# Patient Record
Sex: FEMALE | Race: BLACK OR AFRICAN AMERICAN
[De-identification: names, ages, dates, MRNs, and addresses within clinical notes are randomized per-mention and may not be internally consistent; named-entity substitution may affect disease eponyms.]

---

## 2021-02-25 ENCOUNTER — HOSPITAL ENCOUNTER (OUTPATIENT)
Dept: HOSPITAL 83 - LAB | Age: 46
Discharge: HOME | End: 2021-02-25
Attending: FAMILY MEDICINE
Payer: COMMERCIAL

## 2021-02-25 DIAGNOSIS — R53.83: ICD-10-CM

## 2021-02-25 DIAGNOSIS — E55.9: ICD-10-CM

## 2021-02-25 DIAGNOSIS — E78.5: Primary | ICD-10-CM

## 2021-02-25 DIAGNOSIS — R79.89: ICD-10-CM

## 2021-02-25 LAB
25(OH)D3 SERPL-MCNC: 32.5 NG/ML (ref 30–100)
ALBUMIN SERPL-MCNC: 4.1 GM/DL (ref 3.1–4.5)
ALP SERPL-CCNC: 43 U/L (ref 45–117)
ALT SERPL W P-5'-P-CCNC: 16 U/L (ref 12–78)
AST SERPL-CCNC: 17 IU/L (ref 3–35)
B-HCG SERPL-ACNC: < 1 MIU/ML (ref 1–3)
B-HCG SERPL-ACNC: NEGATIVE
BACTERIA #/AREA URNS HPF: (no result) /[HPF]
BASOPHILS # BLD AUTO: 0 10*3/UL (ref 0–0.1)
BASOPHILS NFR BLD AUTO: 0.7 % (ref 0–1)
BUN SERPL-MCNC: 8 MG/DL (ref 7–24)
CARBAMAZEPINE SERPL-MCNC: 9.5 UG/ML (ref 4–12)
CHLORIDE SERPL-SCNC: 107 MMOL/L (ref 98–107)
CHOLEST SERPL-MCNC: 292 MG/DL (ref ?–200)
CREAT SERPL-MCNC: 0.98 MG/DL (ref 0.55–1.02)
EOSINOPHIL # BLD AUTO: 0.1 10*3/UL (ref 0–0.4)
EOSINOPHIL # BLD AUTO: 1.6 % (ref 1–4)
EPI CELLS #/AREA URNS HPF: (no result) /[HPF]
ERYTHROCYTE [DISTWIDTH] IN BLOOD BY AUTOMATED COUNT: 12.2 % (ref 0–14.5)
FERRITIN SERPL-MCNC: 47.5 NG/ML (ref 10–291)
GGT SERPL-CCNC: 79 U/L (ref 5–55)
HCT VFR BLD AUTO: 39.7 % (ref 37–47)
HDLC SERPL-MCNC: 138 MG/DL (ref 40–60)
HGB UR QL STRIP: (no result)
IRON SERPL-MCNC: 91 UG/DL (ref 50–170)
LDLC SERPL DIRECT ASSAY-MCNC: 141 MG/DL (ref 9–159)
LYMPHOCYTES # BLD AUTO: 1.7 10*3/UL (ref 1.3–4.4)
LYMPHOCYTES NFR BLD AUTO: 38.6 % (ref 27–41)
MCH RBC QN AUTO: 33.3 PG (ref 27–31)
MCHC RBC AUTO-ENTMCNC: 33.5 G/DL (ref 33–37)
MCV RBC AUTO: 99.3 FL (ref 81–99)
MONOCYTES # BLD AUTO: 0.3 10*3/UL (ref 0.1–1)
MONOCYTES NFR BLD MANUAL: 6.5 % (ref 3–9)
NEUT #: 2.3 10*3/UL (ref 2.3–7.9)
NEUT %: 52.6 % (ref 47–73)
NRBC BLD QL AUTO: 0 10*3/UL (ref 0–0)
PH UR STRIP: 5 [PH] (ref 4.5–8)
PLATELET # BLD AUTO: 261 10*3/UL (ref 130–400)
PMV BLD AUTO: 8.5 FL (ref 9.6–12.3)
POTASSIUM SERPL-SCNC: 3.6 MMOL/L (ref 3.5–5.1)
PROT SERPL-MCNC: 7.6 GM/DL (ref 6.4–8.2)
RBC # BLD AUTO: 4 10*6/UL (ref 4.1–5.1)
RBC #/AREA URNS HPF: (no result) RBC/HPF (ref 0–2)
RETICS/RBC NFR AUTO: 0.79 % (ref 0.5–2.5)
SODIUM SERPL-SCNC: 141 MMOL/L (ref 136–145)
SP GR UR: >= 1.03 (ref 1–1.03)
T3 SERPL-MCNC: 30 % (ref 31–39)
T4 SERPL-MCNC: 7 UG/DL (ref 4.8–13.9)
TIBC SERPL-MCNC: 162 UG/DL (ref 250–450)
TRIGL SERPL-MCNC: 67 MG/DL (ref ?–150)
TSH SERPL DL<=0.005 MIU/L-ACNC: 2.19 UIU/ML (ref 0.36–4.75)
UROBILINOGEN UR STRIP-MCNC: 1 E.U./DL (ref 0–1)
VITAMIN B12: 1006 PG/ML (ref 247–911)
VLDLC SERPL CALC-MCNC: 13 MG/DL (ref 6–40)
WBC #/AREA URNS HPF: (no result) WBC/HPF (ref 0–5)
WBC NRBC COR # BLD AUTO: 4.3 10*3/UL (ref 4.8–10.8)

## 2021-02-26 LAB
CALCIUM 24H UR-MRATE: 11.5 NG/ML (ref 4.8–23.3)
DHEA-S SERPL-MCNC: 49.1 UG/DL (ref 57.3–279.2)
FSH SERPL-ACNC: 18.3 MIU/ML
LH SERPL-ACNC: 12.9 MIU/ML
PROGEST SERPL-MCNC: 1.1 NG/ML
SHBG SERPL-SCNC: 349 NMOL/L (ref 24.6–122)

## 2021-02-27 LAB
IGF-I SERPL-MCNC: 106 NG/ML (ref 74–239)
TESTOST FREE SERPL-MCNC: 1.2 PG/ML (ref 0–4.2)

## 2021-03-02 LAB — DHEA SERPL-MCNC: 734 NG/DL (ref 31–701)

## 2021-03-04 ENCOUNTER — HOSPITAL ENCOUNTER (EMERGENCY)
Dept: HOSPITAL 83 - ED | Age: 46
Discharge: LEFT BEFORE DISCHARGE | End: 2021-03-04
Payer: COMMERCIAL

## 2021-03-04 VITALS — WEIGHT: 175 LBS | BODY MASS INDEX: 26.52 KG/M2 | HEIGHT: 67.99 IN

## 2021-03-04 VITALS — DIASTOLIC BLOOD PRESSURE: 74 MMHG

## 2021-03-04 DIAGNOSIS — F41.9: ICD-10-CM

## 2021-03-04 DIAGNOSIS — N39.0: ICD-10-CM

## 2021-03-04 DIAGNOSIS — R42: Primary | ICD-10-CM

## 2021-03-04 DIAGNOSIS — K21.9: ICD-10-CM

## 2021-03-04 DIAGNOSIS — Z53.21: ICD-10-CM

## 2021-03-15 ENCOUNTER — HOSPITAL ENCOUNTER (OUTPATIENT)
Dept: HOSPITAL 83 - RAD | Age: 46
Discharge: HOME | End: 2021-03-15
Attending: FAMILY MEDICINE
Payer: COMMERCIAL

## 2021-03-15 DIAGNOSIS — M48.02: Primary | ICD-10-CM

## 2021-03-15 DIAGNOSIS — M46.02: ICD-10-CM

## 2021-04-30 ENCOUNTER — INITIAL CONSULT (OUTPATIENT)
Dept: NEUROSURGERY | Age: 46
End: 2021-04-30
Payer: MEDICAID

## 2021-04-30 VITALS
WEIGHT: 177 LBS | DIASTOLIC BLOOD PRESSURE: 78 MMHG | BODY MASS INDEX: 26.83 KG/M2 | HEART RATE: 98 BPM | HEIGHT: 68 IN | SYSTOLIC BLOOD PRESSURE: 128 MMHG

## 2021-04-30 DIAGNOSIS — M47.812 CERVICAL SPONDYLOSIS: Primary | ICD-10-CM

## 2021-04-30 PROCEDURE — 1036F TOBACCO NON-USER: CPT | Performed by: PHYSICIAN ASSISTANT

## 2021-04-30 PROCEDURE — 99204 OFFICE O/P NEW MOD 45 MIN: CPT | Performed by: PHYSICIAN ASSISTANT

## 2021-04-30 PROCEDURE — G8419 CALC BMI OUT NRM PARAM NOF/U: HCPCS | Performed by: PHYSICIAN ASSISTANT

## 2021-04-30 PROCEDURE — G8427 DOCREV CUR MEDS BY ELIG CLIN: HCPCS | Performed by: PHYSICIAN ASSISTANT

## 2021-04-30 RX ORDER — CARBAMAZEPINE 400 MG/1
1 TABLET, EXTENDED RELEASE ORAL 2 TIMES DAILY
COMMUNITY
Start: 2021-03-19

## 2021-04-30 RX ORDER — LAMOTRIGINE 150 MG/1
1 TABLET ORAL 4 TIMES DAILY
COMMUNITY
Start: 2021-03-22

## 2021-04-30 ASSESSMENT — ENCOUNTER SYMPTOMS
SHORTNESS OF BREATH: 0
TROUBLE SWALLOWING: 0
ABDOMINAL PAIN: 0
PHOTOPHOBIA: 0

## 2021-04-30 NOTE — PROGRESS NOTES
Subjective:      Patient ID: Akbar Medina is a 40 y.o. female. Cherri Moore is a 40year old female presenting to the office today as a new patient c/o left sided neck pain with radiation into her left shoulder. Describes the pain as intermittent, sharp, and throbbing. Admits to associated numbness in her fingertips and muscle spams. Pain has been present for 2 years, but has been progressively worsening. Lifting objects makes the symptoms worse. Rest provides some relief. Denies recent PT or ODELL. Denies headaches, pain down the extremities, loss of bowel or bladder, saddle anesthesia, fever, chills, cough, SOB, or chest pain. X-ray cervical spine demonstrates straightening of normal curvature, mild disc space narrowing at C5-C6 with anterior osteophytes. Review of Systems   Constitutional: Negative for fever. HENT: Negative for trouble swallowing. Eyes: Negative for photophobia. Respiratory: Negative for shortness of breath. Cardiovascular: Negative for chest pain. Gastrointestinal: Negative for abdominal pain. Endocrine: Negative for heat intolerance. Genitourinary: Negative for flank pain. Musculoskeletal: Positive for arthralgias, myalgias, neck pain and neck stiffness. Skin: Negative for wound. Neurological: Positive for numbness. Negative for headaches. Psychiatric/Behavioral: Negative for confusion. Objective:   Physical Exam  Constitutional:       Appearance: Normal appearance. She is well-developed. HENT:      Head: Normocephalic and atraumatic. Eyes:      Extraocular Movements: Extraocular movements intact. Conjunctiva/sclera: Conjunctivae normal.      Pupils: Pupils are equal, round, and reactive to light. Neck:      Musculoskeletal: Normal range of motion and neck supple. Comments: No significant tenderness to palpation of cervical spine  Cardiovascular:      Rate and Rhythm: Normal rate.    Pulmonary:      Effort: Pulmonary effort is normal. Abdominal:      General: There is no distension. Musculoskeletal: Normal range of motion. Skin:     General: Skin is warm and dry. Neurological:      Mental Status: She is alert. Comments: Alert and oriented x3  CN3-12 intact  Motor strength full  Sensation intact to light touch  No Hoffmans   Psychiatric:         Thought Content: Thought content normal.         Assessment: This is a 40year old female presenting for neck pain      Plan:      -Obtain MRI cervical spine to r/o any spinal stenosis  -Pt is also having symptoms of musculoskeletal pain.  Recommend NSAIDS, heat/ice, massage therapy, and PT  -[facility] report reviewed  -Call/return to Neurosurgery clinic after completion of imaging to discuss results and further treatment plan  -Call/return sooner if symptoms worsen or new issues arise in the interim           Aarti Montoya PA-C

## 2021-06-03 ENCOUNTER — HOSPITAL ENCOUNTER (EMERGENCY)
Dept: HOSPITAL 83 - ED | Age: 46
Discharge: HOME | End: 2021-06-03
Payer: COMMERCIAL

## 2021-06-03 VITALS — DIASTOLIC BLOOD PRESSURE: 78 MMHG

## 2021-06-03 VITALS — HEIGHT: 67.99 IN | WEIGHT: 174 LBS | BODY MASS INDEX: 26.37 KG/M2

## 2021-06-03 DIAGNOSIS — Z98.890: ICD-10-CM

## 2021-06-03 DIAGNOSIS — R00.2: Primary | ICD-10-CM

## 2021-06-03 DIAGNOSIS — Z79.899: ICD-10-CM

## 2021-06-03 DIAGNOSIS — Z98.51: ICD-10-CM

## 2021-06-03 DIAGNOSIS — Z88.0: ICD-10-CM

## 2021-06-03 LAB
ALBUMIN SERPL-MCNC: 3.6 GM/DL (ref 3.1–4.5)
ALP SERPL-CCNC: 43 U/L (ref 45–117)
ALT SERPL W P-5'-P-CCNC: 14 U/L (ref 12–78)
AST SERPL-CCNC: 12 IU/L (ref 3–35)
BASOPHILS # BLD AUTO: 0 10*3/UL (ref 0–0.1)
BASOPHILS NFR BLD AUTO: 0.8 % (ref 0–1)
BUN SERPL-MCNC: 8 MG/DL (ref 7–24)
CHLORIDE SERPL-SCNC: 106 MMOL/L (ref 98–107)
CREAT SERPL-MCNC: 0.87 MG/DL (ref 0.55–1.02)
EOSINOPHIL # BLD AUTO: 0.2 10*3/UL (ref 0–0.4)
EOSINOPHIL # BLD AUTO: 3.8 % (ref 1–4)
ERYTHROCYTE [DISTWIDTH] IN BLOOD BY AUTOMATED COUNT: 12.5 % (ref 0–14.5)
HCT VFR BLD AUTO: 37.3 % (ref 37–47)
LYMPHOCYTES # BLD AUTO: 1.5 10*3/UL (ref 1.3–4.4)
LYMPHOCYTES NFR BLD AUTO: 37.6 % (ref 27–41)
MCH RBC QN AUTO: 32.4 PG (ref 27–31)
MCHC RBC AUTO-ENTMCNC: 32.4 G/DL (ref 33–37)
MCV RBC AUTO: 100 FL (ref 81–99)
MONOCYTES # BLD AUTO: 0.4 10*3/UL (ref 0.1–1)
MONOCYTES NFR BLD MANUAL: 9.3 % (ref 3–9)
NEUT #: 1.9 10*3/UL (ref 2.3–7.9)
NEUT %: 48.2 % (ref 47–73)
NRBC BLD QL AUTO: 0 10*3/UL (ref 0–0)
PLATELET # BLD AUTO: 225 10*3/UL (ref 130–400)
PMV BLD AUTO: 8.5 FL (ref 9.6–12.3)
POTASSIUM SERPL-SCNC: 3.2 MMOL/L (ref 3.5–5.1)
PROT SERPL-MCNC: 7.4 GM/DL (ref 6.4–8.2)
RBC # BLD AUTO: 3.73 10*6/UL (ref 4.1–5.1)
SODIUM SERPL-SCNC: 139 MMOL/L (ref 136–145)
TROPONIN I SERPL-MCNC: < 0.015 NG/ML (ref ?–0.04)
WBC NRBC COR # BLD AUTO: 4 10*3/UL (ref 4.8–10.8)

## 2021-06-15 ENCOUNTER — TELEPHONE (OUTPATIENT)
Dept: NEUROSURGERY | Age: 46
End: 2021-06-15

## 2021-06-15 NOTE — TELEPHONE ENCOUNTER
Left  for patient to call and schedule mri cervical . Gave  Her radiology number.  Approval is in media

## 2022-04-29 ENCOUNTER — HOSPITAL ENCOUNTER (OUTPATIENT)
Dept: HOSPITAL 83 - LAB | Age: 47
Discharge: HOME | End: 2022-04-29
Attending: FAMILY MEDICINE
Payer: COMMERCIAL

## 2022-04-29 DIAGNOSIS — R74.8: ICD-10-CM

## 2022-04-29 DIAGNOSIS — R53.83: ICD-10-CM

## 2022-04-29 DIAGNOSIS — E55.9: ICD-10-CM

## 2022-04-29 DIAGNOSIS — R79.89: ICD-10-CM

## 2022-04-29 DIAGNOSIS — E78.5: Primary | ICD-10-CM

## 2022-04-29 LAB
25(OH)D3 SERPL-MCNC: 28.9 NG/ML (ref 30–100)
ALP SERPL-CCNC: 35 U/L (ref 45–117)
ALT SERPL W P-5'-P-CCNC: 17 U/L (ref 12–78)
AST SERPL-CCNC: 16 IU/L (ref 3–35)
BACTERIA #/AREA URNS HPF: (no result) /[HPF]
BASOPHILS # BLD AUTO: 0 10*3/UL (ref 0–0.1)
BASOPHILS NFR BLD AUTO: 0.7 % (ref 0–1)
BUN SERPL-MCNC: 8 MG/DL (ref 7–24)
CHLORIDE SERPL-SCNC: 105 MMOL/L (ref 98–107)
CHOLEST SERPL-MCNC: 261 MG/DL (ref ?–200)
CREAT SERPL-MCNC: 0.93 MG/DL (ref 0.55–1.02)
EOSINOPHIL # BLD AUTO: 0.1 10*3/UL (ref 0–0.4)
EOSINOPHIL # BLD AUTO: 2.1 % (ref 1–4)
EPI CELLS #/AREA URNS HPF: (no result) /[HPF]
ERYTHROCYTE [DISTWIDTH] IN BLOOD BY AUTOMATED COUNT: 12.6 % (ref 0–14.5)
FERRITIN SERPL-MCNC: 25.3 NG/ML (ref 10–291)
GGT SERPL-CCNC: 69 U/L (ref 5–55)
HCT VFR BLD AUTO: 37.8 % (ref 37–47)
IRON SERPL-MCNC: 91 UG/DL (ref 50–170)
LDLC SERPL DIRECT ASSAY-MCNC: 114 MG/DL (ref 9–159)
LYMPHOCYTES # BLD AUTO: 2.1 10*3/UL (ref 1.3–4.4)
LYMPHOCYTES NFR BLD AUTO: 49.6 % (ref 27–41)
MCH RBC QN AUTO: 33.6 PG (ref 27–31)
MCHC RBC AUTO-ENTMCNC: 34.1 G/DL (ref 33–37)
MCV RBC AUTO: 98.4 FL (ref 81–99)
MONOCYTES # BLD AUTO: 0.4 10*3/UL (ref 0.1–1)
MONOCYTES NFR BLD MANUAL: 8.8 % (ref 3–9)
MUCOUS THREADS URNS QL MICRO: (no result)
NEUT #: 1.6 10*3/UL (ref 2.3–7.9)
NEUT %: 38.8 % (ref 47–73)
NRBC BLD QL AUTO: 0 10*3/UL (ref 0–0)
PH UR STRIP: 7 [PH] (ref 4.5–8)
PLATELET # BLD AUTO: 215 10*3/UL (ref 130–400)
PMV BLD AUTO: 8.8 FL (ref 9.6–12.3)
POTASSIUM SERPL-SCNC: 3.6 MMOL/L (ref 3.5–5.1)
PROT SERPL-MCNC: 7.2 GM/DL (ref 6.4–8.2)
RBC # BLD AUTO: 3.84 10*6/UL (ref 4.1–5.1)
RETICS/RBC NFR AUTO: 0.85 % (ref 0.5–2.5)
SODIUM SERPL-SCNC: 138 MMOL/L (ref 136–145)
SP GR UR: 1.02 (ref 1–1.03)
TIBC SERPL-MCNC: 159 UG/DL (ref 250–450)
TRIGL SERPL-MCNC: 81 MG/DL (ref ?–150)
TSH SERPL DL<=0.005 MIU/L-ACNC: 2.55 UIU/ML (ref 0.36–4.75)
UROBILINOGEN UR STRIP-MCNC: 1 E.U./DL (ref 0–1)
VITAMIN B12: 1005 PG/ML (ref 247–911)
WBC #/AREA URNS HPF: (no result) WBC/HPF (ref 0–5)
WBC NRBC COR # BLD AUTO: 4.2 10*3/UL (ref 4.8–10.8)

## 2022-06-08 DIAGNOSIS — R01.1 MURMUR, CARDIAC: ICD-10-CM

## 2022-06-08 DIAGNOSIS — E78.00 HYPERCHOLESTEREMIA: ICD-10-CM

## 2022-06-08 RX ORDER — LORATADINE 10 MG/1
10 CAPSULE, LIQUID FILLED ORAL DAILY
COMMUNITY

## 2024-04-10 ENCOUNTER — HOSPITAL ENCOUNTER (OUTPATIENT)
Dept: HOSPITAL 83 - LAB | Age: 49
Discharge: HOME | End: 2024-04-10
Attending: FAMILY MEDICINE
Payer: COMMERCIAL

## 2024-04-10 DIAGNOSIS — R53.83: ICD-10-CM

## 2024-04-10 DIAGNOSIS — E55.9: ICD-10-CM

## 2024-04-10 DIAGNOSIS — R79.89: ICD-10-CM

## 2024-04-10 DIAGNOSIS — E78.5: Primary | ICD-10-CM

## 2024-04-10 LAB
25(OH)D3 SERPL-MCNC: 34.2 NG/ML (ref 30–100)
ALP SERPL-CCNC: 60 U/L (ref 46–116)
ALT SERPL W P-5'-P-CCNC: 10 U/L (ref 5–49)
BACTERIA #/AREA URNS HPF: (no result) /[HPF]
BASOPHILS # BLD AUTO: 0 10*3/UL (ref 0–0.1)
BASOPHILS NFR BLD AUTO: 0.5 % (ref 0–1)
BILIRUB UR QL STRIP: (no result)
BUN SERPL-MCNC: 8 MG/DL (ref 9–23)
CARBAMAZEPINE SERPL-MCNC: 10.5 UG/ML (ref 4–12)
CHLORIDE SERPL-SCNC: 106 MMOL/L (ref 98–107)
CHOLEST SERPL-MCNC: 282 MG/DL (ref ?–200)
EOSINOPHIL # BLD AUTO: 0.1 10*3/UL (ref 0–0.4)
EOSINOPHIL # BLD AUTO: 3.2 % (ref 1–4)
EPI CELLS #/AREA URNS HPF: (no result) /[HPF]
ERYTHROCYTE [DISTWIDTH] IN BLOOD BY AUTOMATED COUNT: 13.2 % (ref 0–14.5)
GGT SERPL-CCNC: 57 U/L (ref 0–73)
HCT VFR BLD AUTO: 39.9 % (ref 37–47)
LDLC SERPL DIRECT ASSAY-MCNC: 166 MG/DL (ref 9–159)
LYMPHOCYTES # BLD AUTO: 1.8 10*3/UL (ref 1.3–4.4)
LYMPHOCYTES NFR BLD AUTO: 47.3 % (ref 27–41)
MCH RBC QN AUTO: 32.1 PG (ref 27–31)
MCHC RBC AUTO-ENTMCNC: 31.8 G/DL (ref 33–37)
MCV RBC AUTO: 100.8 FL (ref 81–99)
MONOCYTES # BLD AUTO: 0.3 10*3/UL (ref 0.1–1)
MONOCYTES NFR BLD MANUAL: 6.7 % (ref 3–9)
NEUT #: 1.6 10*3/UL (ref 2.3–7.9)
NEUT %: 42 % (ref 47–73)
NRBC BLD QL AUTO: 0 % (ref 0–0)
PH UR STRIP: 6 [PH] (ref 4.5–8)
PLATELET # BLD AUTO: 253 10*3/UL (ref 130–400)
PMV BLD AUTO: 8.4 FL (ref 9.6–12.3)
POTASSIUM SERPL-SCNC: 3.7 MMOL/L (ref 3.4–5.1)
PROT SERPL-MCNC: 7.1 GM/DL (ref 6–8)
RBC # BLD AUTO: 3.96 10*6/UL (ref 4.1–5.1)
RBC #/AREA URNS HPF: (no result) RBC/HPF (ref 0–2)
RETICS/RBC NFR AUTO: 1.01 % (ref 0.5–2.5)
SP GR UR: 1.03 (ref 1–1.03)
T3 SERPL-MCNC: 27.1 % (ref 22.4–36.7)
T4 SERPL-MCNC: 4.7 UG/DL (ref 4.5–10.9)
TRIGL SERPL-MCNC: 61 MG/DL (ref ?–150)
URATE SERPL-MCNC: 3.5 MG/DL (ref 3.1–7.8)
UROBILINOGEN UR STRIP-MCNC: 0.2 E.U./DL (ref 0–1)
WBC #/AREA URNS HPF: (no result) WBC/HPF (ref 0–5)
WBC NRBC COR # BLD AUTO: 3.7 10*3/UL (ref 4.8–10.8)

## 2024-04-11 LAB — DSDNA AB SER-ACNC: <1 IU/ML (ref 0–9)

## 2024-05-16 ENCOUNTER — HOSPITAL ENCOUNTER (OUTPATIENT)
Dept: HOSPITAL 83 - LAB | Age: 49
Discharge: HOME | End: 2024-05-16
Attending: FAMILY MEDICINE
Payer: COMMERCIAL

## 2024-05-16 DIAGNOSIS — E55.9: Primary | ICD-10-CM

## 2024-05-16 DIAGNOSIS — R79.89: ICD-10-CM

## 2024-05-16 DIAGNOSIS — E78.5: ICD-10-CM

## 2024-05-16 DIAGNOSIS — R53.83: ICD-10-CM

## 2024-05-16 LAB
BASOPHILS # BLD AUTO: 1 % (ref 0–1)
ERYTHROCYTE [DISTWIDTH] IN BLOOD BY AUTOMATED COUNT: 13.7 % (ref 0–14.5)
HCT VFR BLD AUTO: 37.1 % (ref 37–47)
MACROCYTES BLD QL SMEAR: SLIGHT
MCH RBC QN AUTO: 33.3 PG (ref 27–31)
MCHC RBC AUTO-ENTMCNC: 32.3 G/DL (ref 33–37)
MCV RBC AUTO: 103.1 FL (ref 81–99)
NRBC BLD QL AUTO: 0 % (ref 0–0)
OVALOCYTES BLD QL SMEAR: (no result)
PLATELET # BLD AUTO: 227 10*3/UL (ref 130–400)
PLATELET SUFFICIENCY: NORMAL
PMV BLD AUTO: 8.6 FL (ref 9.6–12.3)
POLYCHROMASIA BLD QL SMEAR: SLIGHT
RBC # BLD AUTO: 3.6 10*6/UL (ref 4.1–5.1)
RETICS/RBC NFR AUTO: 1.27 % (ref 0.5–2.5)
TARGETS BLD QL SMEAR: (no result)
TOTAL CELLS COUNTED: 100 #CELLS
WBC NRBC COR # BLD AUTO: 3.8 10*3/UL (ref 4.8–10.8)

## 2024-05-17 LAB
ALBUMIN SERPL ELPH-MCNC: 4 G/DL (ref 2.9–4.4)
ALBUMIN/GLOB SERPL: 1.5 {RATIO} (ref 0.7–1.7)
ALPHA1 GLOB SERPL ELPH-MCNC: 0.3 G/DL (ref 0–0.4)
ALPHA2 GLOB SERPL ELPH-MCNC: 0.7 G/DL (ref 0.4–1)
B-GLOBULIN SERPL ELPH-MCNC: 0.8 G/DL (ref 0.7–1.3)
GAMMA GLOB SERPL ELPH-MCNC: 0.9 G/DL (ref 0.4–1.8)
GLOBULIN SER CALC-MCNC: 2.7 G/DL (ref 2.2–3.9)
PROT SERPL-MCNC: 6.7 G/DL (ref 6–8.5)

## 2024-08-13 ENCOUNTER — HOSPITAL ENCOUNTER (EMERGENCY)
Dept: HOSPITAL 83 - ED | Age: 49
Discharge: LEFT BEFORE DISCHARGE | End: 2024-08-13
Payer: COMMERCIAL

## 2024-08-13 VITALS — DIASTOLIC BLOOD PRESSURE: 88 MMHG | SYSTOLIC BLOOD PRESSURE: 154 MMHG

## 2024-08-13 VITALS — HEIGHT: 67.99 IN | WEIGHT: 209 LBS | BODY MASS INDEX: 31.67 KG/M2

## 2024-08-13 DIAGNOSIS — Z88.8: ICD-10-CM

## 2024-08-13 DIAGNOSIS — F41.9: ICD-10-CM

## 2024-08-13 DIAGNOSIS — R44.3: Primary | ICD-10-CM

## 2024-08-13 DIAGNOSIS — G40.909: ICD-10-CM
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
## 2024-08-13 DIAGNOSIS — Z53.21: ICD-10-CM

## 2024-08-13 DIAGNOSIS — T42.6X5A: ICD-10-CM

## 2024-08-13 DIAGNOSIS — K21.9: ICD-10-CM

## 2024-08-13 DIAGNOSIS — Z88.0: ICD-10-CM

## 2024-08-13 DIAGNOSIS — F30.9: ICD-10-CM

## 2024-08-13 LAB
ALP SERPL-CCNC: 55 U/L (ref 46–116)
ALT SERPL W P-5'-P-CCNC: 12 U/L (ref 5–49)
BASOPHILS # BLD AUTO: 0 10*3/UL (ref 0–0.1)
BASOPHILS NFR BLD AUTO: 1 % (ref 0–1)
BUN SERPL-MCNC: 5 MG/DL (ref 9–23)
CHLORIDE SERPL-SCNC: 107 MMOL/L (ref 98–107)
EOSINOPHIL # BLD AUTO: 0 10*3/UL (ref 0–0.4)
EOSINOPHIL # BLD AUTO: 0.5 % (ref 1–4)
ERYTHROCYTE [DISTWIDTH] IN BLOOD BY AUTOMATED COUNT: 13.4 % (ref 0–14.5)
ETHANOL SERPL-MCNC: < 3 MG/DL (ref ?–3)
HCT VFR BLD AUTO: 40 % (ref 37–47)
LYMPHOCYTES # BLD AUTO: 1 10*3/UL (ref 1.3–4.4)
LYMPHOCYTES NFR BLD AUTO: 25.2 % (ref 27–41)
MCH RBC QN AUTO: 32.7 PG (ref 27–31)
MCHC RBC AUTO-ENTMCNC: 33.3 G/DL (ref 33–37)
MCV RBC AUTO: 98.3 FL (ref 81–99)
MONOCYTES # BLD AUTO: 0.3 10*3/UL (ref 0.1–1)
MONOCYTES NFR BLD MANUAL: 7.2 % (ref 3–9)
NEUT #: 2.7 10*3/UL (ref 2.3–7.9)
NEUT %: 65.6 % (ref 47–73)
NRBC BLD QL AUTO: 0 10*3/UL (ref 0–0)
PLATELET # BLD AUTO: 292 10*3/UL (ref 130–400)
PMV BLD AUTO: 8.4 FL (ref 9.6–12.3)
POTASSIUM SERPL-SCNC: 3.5 MMOL/L (ref 3.4–5.1)
PROT SERPL-MCNC: 7.6 GM/DL (ref 6–8)
RBC # BLD AUTO: 4.07 10*6/UL (ref 4.1–5.1)
WBC NRBC COR # BLD AUTO: 4.1 10*3/UL (ref 4.8–10.8)